# Patient Record
(demographics unavailable — no encounter records)

---

## 2025-03-05 NOTE — ASSESSMENT
[FreeTextEntry1] : 28M with likely pelvic floor spasm due to muscular strain now with persistent perineal referred pain.

## 2025-03-05 NOTE — HISTORY OF PRESENT ILLNESS
[FreeTextEntry1] : 28M presenting with concern for a possible anal fissure.  Patient reports symptoms started November 2024 a few days after straining for a BM. Patient states since then complains of constant pain not related to bowel movements. States pain with sitting, moving, may have some relief with standing at times.  Patient states he has seen multiple specialists Derm, Urology, and GI and they have not been able to figure out. Urology gave him gabapentin for pain and GI gave him nitroglycerin. Patient states he can see a tear from anus to pernieum.   Referred by Dr. Da Yadav  PMH: Denies Meds: Gabapentin, Nitroglycerin All: NKDA PSH: ACL repair FH: Denies CRC/IBD Cscope: Never

## 2025-03-05 NOTE — PHYSICAL EXAM
[Abdomen Tenderness] : ~T No ~M abdominal tenderness [JVD] : no jugular venous distention  [Normal Breath Sounds] : Normal breath sounds [No Rash or Lesion] : No rash or lesion [Alert] : alert [Calm] : calm [de-identified] : Well appearing male in NAD [de-identified] : ROM WNL [de-identified] : MMM [FreeTextEntry1] : The pt was examined in the prone zahraa-knife position with a medical assistant present for the entirety of the examination. Visual examination of the anal verge with effacement of the buttocks revealed no masses, ulcerations, fissures or skin rashes. Digital rectal exam revealed no palpable mass or blood with high sphincter tone and tight pelvic floor. A lubricated anoscope was then inserted revealing healthy appearing distal rectal mucosa and anoderm with three appropriately sized, noninflamed internal hemorrhoids.  The patient tolerated the exam well.

## 2025-03-05 NOTE — PLAN
[TextEntry] : - Agree with Dr. Yadav's plan for MRI of the pelvis to rule out anatomic abnormalities. - Hold nitroglycerine - Just started Gabapentin 100mg qhs, would continue for 2-3 weeks to determine if effective. - Recommend pelvic physiotherapy - RTC in 3-4 weeks. - Barrier ointment to the perineum for skin protection and Lidocaine for symptom relief. - Heat therapy